# Patient Record
Sex: FEMALE | URBAN - METROPOLITAN AREA
[De-identification: names, ages, dates, MRNs, and addresses within clinical notes are randomized per-mention and may not be internally consistent; named-entity substitution may affect disease eponyms.]

---

## 2019-11-12 ENCOUNTER — OFFICE VISIT (OUTPATIENT)
Dept: URBAN - METROPOLITAN AREA CLINIC 48 | Facility: CLINIC | Age: 79
End: 2019-11-12
Payer: MEDICARE

## 2019-11-12 DIAGNOSIS — H40.1434 BILATERAL CAPSULAR GLAUCOMA W/ PSEUDOEXFOLIATION OF LENS, INDETERMINATE STAGE: ICD-10-CM

## 2019-11-12 DIAGNOSIS — H25.13 AGE-RELATED NUCLEAR CATARACT, BILATERAL: Primary | ICD-10-CM

## 2019-11-12 PROCEDURE — 99202 OFFICE O/P NEW SF 15 MIN: CPT | Performed by: OPHTHALMOLOGY

## 2019-11-12 ASSESSMENT — KERATOMETRY
OD: 46.50
OS: 46.50

## 2019-11-12 ASSESSMENT — INTRAOCULAR PRESSURE
OD: 16
OS: 16

## 2019-11-12 ASSESSMENT — VISUAL ACUITY: OS: 20/400

## 2019-11-12 NOTE — IMPRESSION/PLAN
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 Plan: Discussed Pseudoexfoliation glaucoma, Risks and benefits of treatment. Patient will continue care w/ Dr Flores Arnett.

## 2019-11-12 NOTE — IMPRESSION/PLAN
Impression: Age-related nuclear cataract, bilateral: H25.13.  Plan: Patient came in for a second opinion, Discussed cataract surgery with patient, discussed the importance of the sx and was asked to go ahead and see   Dr. Violetta Alberto to schedule cat sx

## 2022-08-08 NOTE — IMPRESSION/PLAN
Impression: Open angle with borderline findings, low risk, bilateral: H40.013. Plan: Explained that currently there is no obvious vision loss from glaucoma. Condition and IOP stable without medication. Careful observation was discussed and is strongly recommended to prevent possible future vision loss. Will continue to monitor intraocular pressure and testing in clinic at regular intervals. No treatment is being implemented at this time. abdomen

## 2022-08-08 NOTE — IMPRESSION/PLAN
Impression: Keratoconjunctivitis sicca, bilateral: E09.439. Plan: Dry eyes account for the patient's complaints. There is no evidence of permanent changes to the cornea. Explained condition does not have a cure and will need artificial tears for maintenance. Patient instructed to use artificial tears 4-6x/daily. Explained it may take time for eyes to acclimate completely to OTC regiment.